# Patient Record
Sex: FEMALE | Race: WHITE
[De-identification: names, ages, dates, MRNs, and addresses within clinical notes are randomized per-mention and may not be internally consistent; named-entity substitution may affect disease eponyms.]

---

## 2018-12-26 ENCOUNTER — HOSPITAL ENCOUNTER (EMERGENCY)
Dept: HOSPITAL 62 - ER | Age: 73
Discharge: HOME | End: 2018-12-26
Payer: MEDICARE

## 2018-12-26 VITALS — DIASTOLIC BLOOD PRESSURE: 55 MMHG | SYSTOLIC BLOOD PRESSURE: 126 MMHG

## 2018-12-26 DIAGNOSIS — I10: ICD-10-CM

## 2018-12-26 DIAGNOSIS — E11.9: ICD-10-CM

## 2018-12-26 DIAGNOSIS — R09.81: ICD-10-CM

## 2018-12-26 DIAGNOSIS — J34.89: ICD-10-CM

## 2018-12-26 DIAGNOSIS — R21: Primary | ICD-10-CM

## 2018-12-26 DIAGNOSIS — R09.89: ICD-10-CM

## 2018-12-26 LAB
ADD MANUAL DIFF: NO
ALBUMIN SERPL-MCNC: 3.7 G/DL (ref 3.5–5)
ALP SERPL-CCNC: 85 U/L (ref 38–126)
ALT SERPL-CCNC: 19 U/L (ref 9–52)
ANION GAP SERPL CALC-SCNC: 5 MMOL/L (ref 5–19)
APPEARANCE UR: (no result)
APTT BLD: 26.6 SEC (ref 23.5–35.8)
APTT PPP: YELLOW S
AST SERPL-CCNC: 29 U/L (ref 14–36)
BASOPHILS # BLD AUTO: 0 10^3/UL (ref 0–0.2)
BASOPHILS NFR BLD AUTO: 0.3 % (ref 0–2)
BILIRUB DIRECT SERPL-MCNC: 0.3 MG/DL (ref 0–0.4)
BILIRUB SERPL-MCNC: 0.4 MG/DL (ref 0.2–1.3)
BILIRUB UR QL STRIP: NEGATIVE
BUN SERPL-MCNC: 18 MG/DL (ref 7–20)
CALCIUM: 9.5 MG/DL (ref 8.4–10.2)
CHLORIDE SERPL-SCNC: 106 MMOL/L (ref 98–107)
CO2 SERPL-SCNC: 30 MMOL/L (ref 22–30)
EOSINOPHIL # BLD AUTO: 0.1 10^3/UL (ref 0–0.6)
EOSINOPHIL NFR BLD AUTO: 1 % (ref 0–6)
ERYTHROCYTE [DISTWIDTH] IN BLOOD BY AUTOMATED COUNT: 15 % (ref 11.5–14)
GLUCOSE SERPL-MCNC: 122 MG/DL (ref 75–110)
GLUCOSE UR STRIP-MCNC: NEGATIVE MG/DL
HCT VFR BLD CALC: 42.1 % (ref 36–47)
HGB BLD-MCNC: 13.9 G/DL (ref 12–15.5)
INR PPP: 0.92
KETONES UR STRIP-MCNC: NEGATIVE MG/DL
LYMPHOCYTES # BLD AUTO: 2.6 10^3/UL (ref 0.5–4.7)
LYMPHOCYTES NFR BLD AUTO: 26.6 % (ref 13–45)
MCH RBC QN AUTO: 29 PG (ref 27–33.4)
MCHC RBC AUTO-ENTMCNC: 32.9 G/DL (ref 32–36)
MCV RBC AUTO: 88 FL (ref 80–97)
MONOCYTES # BLD AUTO: 0.6 10^3/UL (ref 0.1–1.4)
MONOCYTES NFR BLD AUTO: 6.4 % (ref 3–13)
NEUTROPHILS # BLD AUTO: 6.4 10^3/UL (ref 1.7–8.2)
NEUTS SEG NFR BLD AUTO: 65.7 % (ref 42–78)
NITRITE UR QL STRIP: NEGATIVE
PH UR STRIP: 5 [PH] (ref 5–9)
PLATELET # BLD: 210 10^3/UL (ref 150–450)
POTASSIUM SERPL-SCNC: 4.8 MMOL/L (ref 3.6–5)
PROT SERPL-MCNC: 7.2 G/DL (ref 6.3–8.2)
PROT UR STRIP-MCNC: NEGATIVE MG/DL
PROTHROMBIN TIME: 12.8 SEC (ref 11.4–15.4)
RBC # BLD AUTO: 4.79 10^6/UL (ref 3.72–5.28)
SODIUM SERPL-SCNC: 141.3 MMOL/L (ref 137–145)
SP GR UR STRIP: 1.02
TOTAL CELLS COUNTED % (AUTO): 100 %
UROBILINOGEN UR-MCNC: NEGATIVE MG/DL (ref ?–2)
WBC # BLD AUTO: 9.8 10^3/UL (ref 4–10.5)

## 2018-12-26 PROCEDURE — 85025 COMPLETE CBC W/AUTO DIFF WBC: CPT

## 2018-12-26 PROCEDURE — 80053 COMPREHEN METABOLIC PANEL: CPT

## 2018-12-26 PROCEDURE — 36415 COLL VENOUS BLD VENIPUNCTURE: CPT

## 2018-12-26 PROCEDURE — 99283 EMERGENCY DEPT VISIT LOW MDM: CPT

## 2018-12-26 PROCEDURE — 81001 URINALYSIS AUTO W/SCOPE: CPT

## 2018-12-26 PROCEDURE — 87086 URINE CULTURE/COLONY COUNT: CPT

## 2018-12-26 PROCEDURE — 85730 THROMBOPLASTIN TIME PARTIAL: CPT

## 2018-12-26 PROCEDURE — 85610 PROTHROMBIN TIME: CPT

## 2018-12-26 NOTE — ER DOCUMENT REPORT
HPI





<PK HERNANDEZ - Last Filed: 12/26/18 17:17>





- HPI


Patient complains to provider of: Skin rash


Onset: Yesterday


Onset/Duration: Gradual, Worse


Pain Level: Denies


Context: 





Patient presents complaining of skin rash that started to her legs and has 

started to spread today.  Patient states she has rash to arms and legs.  Patient

denies any new foods medications or detergents.  Patient states that she has had

recent cold symptoms but nothing significant.


Associated Symptoms: Rhinnorhea, Other - Skin rash.  denies: Earache, Fever, 

Headache, Vomiting


Exacerbated by: Denies


Relieved by: Denies


Similar symptoms previously: No


Recently seen / treated by doctor: No





- ROS


ROS below otherwise negative: Yes


Systems Reviewed and Negative: Yes All other systems reviewed and negative





- CONSTITUTIONAL


Constitutional: DENIES: Fever, Chills





- EENT


EENT: REPORTS: Nasal Drainage-Clear, Congestion.  DENIES: Ear Pain





- NEURO


Neurology: DENIES: Headache





- CARDIOVASCULAR


Cardiovascular: DENIES: Chest pain





- RESPIRATORY


Respiratory: DENIES: Trouble Breathing





- GASTROINTESTINAL


Gastrointestinal: DENIES: Nausea, Patient vomiting





- DERM


Skin Color: Normal


Skin Problems: Rash





<KACEY BLAIR - Last Filed: 12/26/18 18:58>





- HPI


Time Seen by Provider: 12/26/18 16:02





Past Medical History





- General


Information source: Patient





- Social History


Smoking Status: Never Smoker


Frequency of alcohol use: None


Drug Abuse: None


Lives with: Family


Family History: Reviewed & Not Pertinent


Patient has suicidal ideation: No


Patient has homicidal ideation: No





- Past Medical History


Cardiac Medical History: Reports: Hx Hypercholesterolemia, Hx Hypertension


Endocrine Medical History: Reports: Hx Diabetes Mellitus Type 2, Hx 

Hypothyroidism


Renal/ Medical History: Denies: Hx Peritoneal Dialysis


Surgical Hx: Negative





<KACEY BLAIR - Last Filed: 12/26/18 18:58>





Vertical Provider Document





- CONSTITUTIONAL


Agree With Documented VS: Yes


Exam Limitations: No Limitations


General Appearance: WD/WN, No Apparent Distress





- INFECTION CONTROL


TRAVEL OUTSIDE OF THE [place] IN LAST 30 DAYS: No





- HEENT


HEENT: Atraumatic, Normal ENT Exam, Normocephalic


Notes: 





No oral or ocular mucosal skin lesions





- NECK


Neck: Normal Inspection, Supple





- RESPIRATORY


Respiratory: Breath Sounds Normal, No Respiratory Distress





- CARDIOVASCULAR


Cardiovascular: Regular Rate, Regular Rhythm





- GI/ABDOMEN


Gastrointestinal: Abdomen Soft





- BACK


Back: Normal Inspection





- MUSCULOSKELETAL/EXTREMETIES


Musculoskeletal/Extremeties: MAEW, FROM





- NEURO


Level of Consciousness: Awake, Alert, Appropriate


Motor/Sensory: No Motor Deficit





- DERM


Integumentary: Warm, Dry, Rash - Patient with erythematous macular rash 

distributed to extremities most heavily to the lower extremities with scattered 

lesions to abdomen, areas of nonblanching concerning for petechiae





<KACEY BLAIR - Last Filed: 12/26/18 18:58>





Course





- Vital Signs


Vital signs: 


                                        











Temp Pulse Resp BP Pulse Ox


 


 97.9 F   80   17   128/53 H  95 


 


 12/26/18 15:23  12/26/18 15:23  12/26/18 15:23  12/26/18 15:23  12/26/18 15:23














- Laboratory


Result Diagrams: 


                                 12/26/18 16:10





                                 12/26/18 16:10


Laboratory results interpreted by me: 


                                        











  12/26/18 12/26/18 12/26/18





  16:10 16:10 16:10


 


RDW  15.0 H  


 


Glucose   122 H 


 


Urine Blood    MODERATE H


 


Ur Leukocyte Esterase    TRACE H














<PK HERNANDEZ - Last Filed: 12/26/18 17:17>





- Re-evaluation


Re-evalutation: 





12/26/18 16:24


Consulted with Dr. Moess regarding patient's diagnostic workup, agrees with 

planned evaluation time.


12/26/18 


Patient with petechial skin rash that looks concerning for vasculitis.  Patient 

advised that she may need to see a dermatologist and have biopsies performed as 

well as additional outpatient testing to further evaluate her skin rash.  

Patient without any anemia, thrombocytopenia or abnormal coagulation test 

results.  Patient denies any urinary symptoms.  Will culture urine at this time.

 Patient encouraged to return for any new or worsening symptoms.  Patient 

nontoxic in appearance and otherwise appears well.





- Vital Signs


Vital signs: 


                                        











Temp Pulse Resp BP Pulse Ox


 


 97.9 F   80   17   128/53 H  95 


 


 12/26/18 15:23  12/26/18 15:23  12/26/18 15:23  12/26/18 15:23  12/26/18 15:23














- Laboratory


Result Diagrams: 


                                 12/26/18 16:10





                                 12/26/18 16:10


Laboratory results interpreted by me: 





12/26/18 18:58


                               Labs- Entire Visit











  12/26/18 12/26/18 12/26/18





  16:10 16:10 16:10


 


WBC  9.8  


 


RBC  4.79  


 


Hgb  13.9  


 


Hct  42.1  


 


MCV  88  


 


MCH  29.0  


 


MCHC  32.9  


 


RDW  15.0 H  


 


Plt Count  210  


 


Seg Neutrophils %  65.7  


 


Lymphocytes %  26.6  


 


Monocytes %  6.4  


 


Eosinophils %  1.0  


 


Basophils %  0.3  


 


Absolute Neutrophils  6.4  


 


Absolute Lymphocytes  2.6  


 


Absolute Monocytes  0.6  


 


Absolute Eosinophils  0.1  


 


Absolute Basophils  0.0  


 


PT   12.8 


 


INR   0.92 


 


APTT   26.6 


 


Sodium    141.3


 


Potassium    4.8


 


Chloride    106


 


Carbon Dioxide    30


 


Anion Gap    5


 


BUN    18


 


Creatinine    0.64


 


Est GFR ( Amer)    > 60


 


Est GFR (Non-Af Amer)    > 60


 


Glucose    122 H


 


Calcium    9.5


 


Total Bilirubin    0.4


 


Direct Bilirubin    0.3


 


Neonat Total Bilirubin    Not Reportable


 


Neonat Direct Bilirubin    Not Reportable


 


Neonat Indirect Bili    Not Reportable


 


AST    29


 


ALT    19


 


Alkaline Phosphatase    85


 


Total Protein    7.2


 


Albumin    3.7


 


Urine Color   


 


Urine Appearance   


 


Urine pH   


 


Ur Specific Gravity   


 


Urine Protein   


 


Urine Glucose (UA)   


 


Urine Ketones   


 


Urine Blood   


 


Urine Nitrite   


 


Urine Bilirubin   


 


Urine Urobilinogen   


 


Ur Leukocyte Esterase   


 


Urine WBC (Auto)   


 


Urine RBC (Auto)   


 


Squamous Epi Cells Auto   


 


Urine Mucus (Auto)   


 


Urine Ascorbic Acid   














  12/26/18





  16:10


 


WBC 


 


RBC 


 


Hgb 


 


Hct 


 


MCV 


 


MCH 


 


MCHC 


 


RDW 


 


Plt Count 


 


Seg Neutrophils % 


 


Lymphocytes % 


 


Monocytes % 


 


Eosinophils % 


 


Basophils % 


 


Absolute Neutrophils 


 


Absolute Lymphocytes 


 


Absolute Monocytes 


 


Absolute Eosinophils 


 


Absolute Basophils 


 


PT 


 


INR 


 


APTT 


 


Sodium 


 


Potassium 


 


Chloride 


 


Carbon Dioxide 


 


Anion Gap 


 


BUN 


 


Creatinine 


 


Est GFR ( Amer) 


 


Est GFR (Non-Af Amer) 


 


Glucose 


 


Calcium 


 


Total Bilirubin 


 


Direct Bilirubin 


 


Neonat Total Bilirubin 


 


Neonat Direct Bilirubin 


 


Neonat Indirect Bili 


 


AST 


 


ALT 


 


Alkaline Phosphatase 


 


Total Protein 


 


Albumin 


 


Urine Color  YELLOW


 


Urine Appearance  SLIGHTLY-CLOUDY


 


Urine pH  5.0


 


Ur Specific Gravity  1.016


 


Urine Protein  NEGATIVE


 


Urine Glucose (UA)  NEGATIVE


 


Urine Ketones  NEGATIVE


 


Urine Blood  MODERATE H


 


Urine Nitrite  NEGATIVE


 


Urine Bilirubin  NEGATIVE


 


Urine Urobilinogen  NEGATIVE


 


Ur Leukocyte Esterase  TRACE H


 


Urine WBC (Auto)  4


 


Urine RBC (Auto)  6


 


Squamous Epi Cells Auto  5


 


Urine Mucus (Auto)  OCC


 


Urine Ascorbic Acid  NEGATIVE














<KACEY BLAIR - Last Filed: 12/26/18 18:58>





Discharge





<PK HERNANDEZ - Last Filed: 12/26/18 17:17>





<KACEY BLAIR - Last Filed: 12/26/18 18:58>





- Discharge


Clinical Impression: 


 Skin rash





Condition: Stable


Disposition: HOME, SELF-CARE


Instructions:  Steroid Medication


Additional Instructions: 


Return immediately for any new or worsening symptoms





Followup with your primary care provider, call tomorrow to make a followup 

appointment





Follow-up with dermatology for further evaluation, call tomorrow for an appoint

ment





Urine culture is pending, we will call if you need any different treatment


Prescriptions: 


Prednisone [Deltasone 20 mg Tablet] 2 tab PO DAILY 4 Days  tablet


Referrals: 


VIPIN HELM DO [ACTIVE STAFF] - Follow up tomorrow